# Patient Record
Sex: FEMALE | Race: WHITE | NOT HISPANIC OR LATINO | Employment: OTHER | URBAN - METROPOLITAN AREA
[De-identification: names, ages, dates, MRNs, and addresses within clinical notes are randomized per-mention and may not be internally consistent; named-entity substitution may affect disease eponyms.]

---

## 2017-01-20 ENCOUNTER — OFFICE VISIT (OUTPATIENT)
Dept: FAMILY MEDICINE CLINIC | Facility: CLINIC | Age: 82
End: 2017-01-20

## 2017-01-20 VITALS
WEIGHT: 136.2 LBS | BODY MASS INDEX: 21.89 KG/M2 | SYSTOLIC BLOOD PRESSURE: 108 MMHG | TEMPERATURE: 98.8 F | DIASTOLIC BLOOD PRESSURE: 48 MMHG | RESPIRATION RATE: 20 BRPM | HEIGHT: 66 IN | HEART RATE: 72 BPM

## 2017-01-20 DIAGNOSIS — H91.93 HEARING LOSS, BILATERAL: ICD-10-CM

## 2017-01-20 DIAGNOSIS — Z76.89 ESTABLISHING CARE WITH NEW DOCTOR, ENCOUNTER FOR: ICD-10-CM

## 2017-01-20 DIAGNOSIS — I10 ESSENTIAL HYPERTENSION: ICD-10-CM

## 2017-01-20 DIAGNOSIS — R29.6 FREQUENT FALLS: ICD-10-CM

## 2017-01-20 DIAGNOSIS — M79.632 LEFT FOREARM PAIN: Primary | ICD-10-CM

## 2017-01-20 PROCEDURE — 99213 OFFICE O/P EST LOW 20 MIN: CPT | Performed by: NURSE PRACTITIONER

## 2017-01-20 RX ORDER — LORAZEPAM 1 MG/1
1 TABLET ORAL EVERY 8 HOURS PRN
COMMUNITY

## 2017-01-20 RX ORDER — PAROXETINE HYDROCHLORIDE 20 MG/1
20 TABLET, FILM COATED ORAL EVERY MORNING
COMMUNITY
End: 2017-06-07 | Stop reason: SDUPTHER

## 2017-01-20 RX ORDER — CHLORPROMAZINE HYDROCHLORIDE 10 MG/1
10 TABLET, FILM COATED ORAL 2 TIMES DAILY
COMMUNITY
End: 2017-05-11 | Stop reason: SDUPTHER

## 2017-01-20 RX ORDER — BISOPROLOL FUMARATE 5 MG/1
2.5 TABLET, FILM COATED ORAL DAILY
COMMUNITY
End: 2017-04-28 | Stop reason: SDUPTHER

## 2017-01-20 NOTE — MR AVS SNAPSHOT
"                        Mallory Champagne   1/20/2017 3:00 PM   Office Visit    Dept Phone:  [phone]   Encounter #:  [id number]    Provider:  MAGALY Moreira   Department:  [facility]IC[facility]                Your Full Care Plan              Your Updated Medication List          This list is accurate as of: 1/20/17  4:34 PM.  Always use your most recent med list.                bisoprolol 5 MG tablet   Commonly known as:  ZEBeta       chlorproMAZINE 10 MG tablet   Commonly known as:  THORAZINE       LORazepam 1 MG tablet   Commonly known as:  ATIVAN       PARoxetine 20 MG tablet   Commonly known as:  PAXIL               We Performed the Following     CBC & AUTO Differential     Comprehensive Metabolic Panel     TSH     XR Forearm 2 View Left       You Were Diagnosed With        Codes Comments    Left forearm pain    -  Primary ICD-10-CM: M79.632  ICD-9-CM: 729.5     Frequent falls     ICD-10-CM: R29.6  ICD-9-CM: V15.88     Essential hypertension     ICD-10-CM: I10  ICD-9-CM: 401.9       Instructions     None    Patient Instructions History      Upcoming Appointments     Visit Type Date Time Department    NEW PATIENT 1/20/2017  3:00 PM [facility]      Filecoin Signup     Our records indicate that you have declined [facility] Filecoin signup. If you would like to sign up for File[employer], please email [email or web address] or call [phone] to obtain an activation code.             Other Info from Your Visit           Allergies     No Known Allergies      Reason for Visit     Establish Care     Edema LUE.  She fell last weekend. She was not evaluated anywhere. She refused the ER. Appointments were made for her previous PCP, but they did not keep those appointments. She falls frequently. She feels scared all of the time      Vital Signs     Blood Pressure Pulse Temperature Respirations Height Weight    108/48 72 98.8 °F (37.1 °C) 20 66\" (167.6 cm) 136 lb 3.2 oz (61.8 kg)   " Body Mass Index Smoking Status                21.98 kg/m2 Never Assessed          Problems and Diagnoses Noted     Left forearm pain    -  Primary    Frequent falls        High blood pressure

## 2017-01-20 NOTE — PROGRESS NOTES
Subjective   Mallory Champagne is a 86 y.o. female.     History of Present Illness   NP to establish care accompanied by her  and care giver  She is a previous pt of Dr Bernard who is retiring due to poor health  Pt is hard of hearing (but getting hearing aids soon) and  is a poor historian of current health conditions, will request records from previous PCP as not really able to get an accurate health history at this visit  Care giver stays with family during week and reports that pt is having frequent falls and needs PT for strength training  Pt ambulates with a walker.  Last labs approximately 6 months ago  LUE swollen for the past week, using ice and keeping arm elevated, no bruising or redness noticed to skin, pt does report some discomfort to left forearm and left shoulder but not to elbow.     The following portions of the patient's history were reviewed and updated as appropriate: allergies, current medications, past family history, past medical history, past social history, past surgical history and problem list.    Review of Systems   Constitutional: Positive for activity change. Negative for unexpected weight change.   HENT: Positive for hearing loss.    Eyes: Negative.    Respiratory: Negative.    Cardiovascular: Positive for leg swelling.   Gastrointestinal: Positive for constipation.   Endocrine: Negative.    Genitourinary: Negative.    Musculoskeletal: Positive for joint swelling (left upper extremity swelling).   Skin: Negative.    Allergic/Immunologic: Negative.    Neurological: Negative.    Hematological: Negative.    Psychiatric/Behavioral: Positive for confusion and dysphoric mood.       Objective   Physical Exam   Constitutional: She appears well-developed and well-nourished. No distress.   HENT:   Head: Normocephalic.   Right Ear: External ear normal. Decreased hearing is noted.   Left Ear: External ear normal. Decreased hearing is noted.   Nose: Nose normal.   Mouth/Throat: Oropharynx  is clear and moist.   Eyes: Pupils are equal, round, and reactive to light.   Neck: Neck supple.   Cardiovascular: Normal rate, regular rhythm and normal heart sounds.    Pulmonary/Chest: Effort normal and breath sounds normal.       Musculoskeletal:        Left elbow: She exhibits swelling (swelling along elbow and forearm into hand, no redness, I suspect lymphedema). Tenderness (tenderness of left forearm but not of elbow) found.   Neurological: She is alert.   Skin: Skin is warm, dry and intact. She is not diaphoretic.   Nursing note and vitals reviewed.      Assessment/Plan   Mallory was seen today for establish care and edema.    Diagnoses and all orders for this visit:    Left forearm pain  -     XR Forearm 2 View Left    Frequent falls    Essential hypertension  -     CBC & AUTO Differential  -     Comprehensive Metabolic Panel  -     TSH

## 2017-01-21 DIAGNOSIS — M25.471 SWELLING OF BOTH ANKLES: ICD-10-CM

## 2017-01-21 DIAGNOSIS — M25.472 SWELLING OF BOTH ANKLES: ICD-10-CM

## 2017-01-21 DIAGNOSIS — R29.6 FREQUENT FALLS: Primary | ICD-10-CM

## 2017-01-21 LAB
ALBUMIN SERPL-MCNC: 3.4 G/DL (ref 3.2–4.8)
ALBUMIN/GLOB SERPL: 1 G/DL (ref 1.5–2.5)
ALP SERPL-CCNC: 139 U/L (ref 25–100)
ALT SERPL-CCNC: 9 U/L (ref 7–40)
AST SERPL-CCNC: 15 U/L (ref 0–33)
BASOPHILS # BLD AUTO: 0.03 10*3/MM3 (ref 0–0.2)
BASOPHILS NFR BLD AUTO: 0.3 % (ref 0–1)
BILIRUB SERPL-MCNC: 0.2 MG/DL (ref 0.3–1.2)
BUN SERPL-MCNC: 21 MG/DL (ref 9–23)
BUN/CREAT SERPL: 23.3 (ref 7–25)
CALCIUM SERPL-MCNC: 8.9 MG/DL (ref 8.7–10.4)
CHLORIDE SERPL-SCNC: 107 MMOL/L (ref 99–109)
CO2 SERPL-SCNC: 21 MMOL/L (ref 20–31)
CREAT SERPL-MCNC: 0.9 MG/DL (ref 0.6–1.3)
EOSINOPHIL # BLD AUTO: 0.1 10*3/MM3 (ref 0.1–0.3)
EOSINOPHIL NFR BLD AUTO: 1.1 % (ref 0–3)
ERYTHROCYTE [DISTWIDTH] IN BLOOD BY AUTOMATED COUNT: 16.9 % (ref 11.3–14.5)
GLOBULIN SER CALC-MCNC: 3.4 GM/DL
GLUCOSE SERPL-MCNC: 89 MG/DL (ref 70–100)
HCT VFR BLD AUTO: 35.7 % (ref 34.5–44)
HGB BLD-MCNC: 11.3 G/DL (ref 11.5–15.5)
IMM GRANULOCYTES # BLD: 0.02 10*3/MM3 (ref 0–0.03)
IMM GRANULOCYTES NFR BLD: 0.2 % (ref 0–0.6)
LYMPHOCYTES # BLD AUTO: 1.44 10*3/MM3 (ref 0.6–4.8)
LYMPHOCYTES NFR BLD AUTO: 16.2 % (ref 24–44)
MCH RBC QN AUTO: 26.3 PG (ref 27–31)
MCHC RBC AUTO-ENTMCNC: 31.7 G/DL (ref 32–36)
MCV RBC AUTO: 83 FL (ref 80–99)
MONOCYTES # BLD AUTO: 0.57 10*3/MM3 (ref 0–1)
MONOCYTES NFR BLD AUTO: 6.4 % (ref 0–12)
NEUTROPHILS # BLD AUTO: 6.74 10*3/MM3 (ref 1.5–8.3)
NEUTROPHILS NFR BLD AUTO: 75.8 % (ref 41–71)
PLATELET # BLD AUTO: 434 10*3/MM3 (ref 150–450)
POTASSIUM SERPL-SCNC: 4.6 MMOL/L (ref 3.5–5.5)
PROT SERPL-MCNC: 6.8 G/DL (ref 5.7–8.2)
RBC # BLD AUTO: 4.3 10*6/MM3 (ref 3.89–5.14)
SODIUM SERPL-SCNC: 142 MMOL/L (ref 132–146)
TSH SERPL DL<=0.005 MIU/L-ACNC: 1.72 MIU/ML (ref 0.35–5.35)
WBC # BLD AUTO: 8.9 10*3/MM3 (ref 3.5–10.8)

## 2017-01-22 NOTE — PATIENT INSTRUCTIONS
Fall Prevention in the Home   Falls can cause injuries and can affect people from all age groups. There are many simple things that you can do to make your home safe and to help prevent falls.  WHAT CAN I DO ON THE OUTSIDE OF MY HOME?  · Regularly repair the edges of walkways and driveways and fix any cracks.  · Remove high doorway thresholds.  · Trim any shrubbery on the main path into your home.  · Use bright outdoor lighting.  · Clear walkways of debris and clutter, including tools and rocks.  · Regularly check that handrails are securely fastened and in good repair. Both sides of any steps should have handrails.  · Install guardrails along the edges of any raised decks or porches.  · Have leaves, snow, and ice cleared regularly.  · Use sand or salt on walkways during winter months.  · In the garage, clean up any spills right away, including grease or oil spills.  WHAT CAN I DO IN THE BATHROOM?  · Use night lights.  · Install grab bars by the toilet and in the tub and shower. Do not use towel bars as grab bars.  · Use non-skid mats or decals on the floor of the tub or shower.  · If you need to sit down while you are in the shower, use a plastic, non-slip stool..  · Keep the floor dry. Immediately clean up any water that spills on the floor.  · Remove soap buildup in the tub or shower on a regular basis.  · Attach bath mats securely with double-sided non-slip rug tape.  · Remove throw rugs and other tripping hazards from the floor.  WHAT CAN I DO IN THE BEDROOM?  · Use night lights.  · Make sure that a bedside light is easy to reach.  · Do not use oversized bedding that drapes onto the floor.  · Have a firm chair that has side arms to use for getting dressed.  · Remove throw rugs and other tripping hazards from the floor.  WHAT CAN I DO IN THE KITCHEN?   · Clean up any spills right away.  · Avoid walking on wet floors.  · Place frequently used items in easy-to-reach places.  · If you need to reach for something  above you, use a sturdy step stool that has a grab bar.  · Keep electrical cables out of the way.  · Do not use floor polish or wax that makes floors slippery. If you have to use wax, make sure that it is non-skid floor wax.  · Remove throw rugs and other tripping hazards from the floor.  WHAT CAN I DO IN THE STAIRWAYS?  · Do not leave any items on the stairs.  · Make sure that there are handrails on both sides of the stairs. Fix handrails that are broken or loose. Make sure that handrails are as long as the stairways.  · Check any carpeting to make sure that it is firmly attached to the stairs. Fix any carpet that is loose or worn.  · Avoid having throw rugs at the top or bottom of stairways, or secure the rugs with carpet tape to prevent them from moving.  · Make sure that you have a light switch at the top of the stairs and the bottom of the stairs. If you do not have them, have them installed.  WHAT ARE SOME OTHER FALL PREVENTION TIPS?  · Wear closed-toe shoes that fit well and support your feet. Wear shoes that have rubber soles or low heels.  · When you use a stepladder, make sure that it is completely opened and that the sides are firmly locked. Have someone hold the ladder while you are using it. Do not climb a closed stepladder.  · Add color or contrast paint or tape to grab bars and handrails in your home. Place contrasting color strips on the first and last steps.  · Use mobility aids as needed, such as canes, walkers, scooters, and crutches.  · Turn on lights if it is dark. Replace any light bulbs that burn out.  · Set up furniture so that there are clear paths. Keep the furniture in the same spot.  · Fix any uneven floor surfaces.  · Choose a carpet design that does not hide the edge of steps of a stairway.  · Be aware of any and all pets.  · Review your medicines with your healthcare provider. Some medicines can cause dizziness or changes in blood pressure, which increase your risk of falling.  Talk  with your health care provider about other ways that you can decrease your risk of falls. This may include working with a physical therapist or  to improve your strength, balance, and endurance.     This information is not intended to replace advice given to you by your health care provider. Make sure you discuss any questions you have with your health care provider.     Document Released: 12/08/2003 Document Revised: 05/03/2016 Document Reviewed: 01/22/2016  Elsevier Interactive Patient Education ©2016 Elsevier Inc[employer]

## 2017-02-09 ENCOUNTER — TELEPHONE (OUTPATIENT)
Dept: FAMILY MEDICINE CLINIC | Facility: CLINIC | Age: 82
End: 2017-02-09

## 2017-02-17 ENCOUNTER — OFFICE VISIT (OUTPATIENT)
Dept: FAMILY MEDICINE CLINIC | Facility: CLINIC | Age: 82
End: 2017-02-17

## 2017-02-17 VITALS
HEART RATE: 94 BPM | SYSTOLIC BLOOD PRESSURE: 138 MMHG | DIASTOLIC BLOOD PRESSURE: 54 MMHG | HEIGHT: 66 IN | BODY MASS INDEX: 22.02 KG/M2 | RESPIRATION RATE: 18 BRPM | WEIGHT: 137 LBS | TEMPERATURE: 98.4 F

## 2017-02-17 DIAGNOSIS — M20.42 HAMMER TOES OF BOTH FEET: Primary | ICD-10-CM

## 2017-02-17 DIAGNOSIS — I10 ESSENTIAL HYPERTENSION: ICD-10-CM

## 2017-02-17 DIAGNOSIS — R42 DIZZINESS: ICD-10-CM

## 2017-02-17 DIAGNOSIS — M20.41 HAMMER TOES OF BOTH FEET: Primary | ICD-10-CM

## 2017-02-17 PROCEDURE — 99213 OFFICE O/P EST LOW 20 MIN: CPT | Performed by: FAMILY MEDICINE

## 2017-02-17 NOTE — PROGRESS NOTES
Subjective   Mallory Champagne is a 86 y.o. female.     History of Present Illness     They recommended PT and OT by Amy Simmons at last appointment  The therapist was concerned about some of their medications.  It appears they are concerned about ativan due to fatigue  She is sleeping 20 out of 24 hours a sleep and is not doing anything    She has had 2 cancer surgeries  Chart notes that dc'd from  due to noncompliance    They would like to see a podiatrist for her feet    Every time she stands up she is dizzy  Couple steps she is ready to faint and OOB    She is constipated but BMs go in cycles      Review of Systems   Constitutional: Positive for fatigue.   Neurological: Positive for dizziness.       Objective   Physical Exam   Constitutional: She appears well-developed and well-nourished. No distress.   Cardiovascular: Normal rate, regular rhythm and normal heart sounds.    Pulmonary/Chest: Effort normal and breath sounds normal.   Musculoskeletal:   Slow gait with walker   Psychiatric: She has a normal mood and affect. Her behavior is normal.   Nursing note and vitals reviewed.      Assessment/Plan   Mallory was seen today for med management and anxiety.    Diagnoses and all orders for this visit:    Hammer toes of both feet  -     Ambulatory Referral to Podiatry    Dizziness    Essential hypertension    podiatry refill  Will work on reducing ativan usage, hand written note given  May need to decrease the zebeta for orthostatic hypotension, consider reducing dose in the future

## 2017-04-03 ENCOUNTER — OFFICE VISIT (OUTPATIENT)
Dept: FAMILY MEDICINE CLINIC | Facility: CLINIC | Age: 82
End: 2017-04-03

## 2017-04-03 VITALS
WEIGHT: 135 LBS | BODY MASS INDEX: 21.69 KG/M2 | TEMPERATURE: 97.6 F | HEIGHT: 66 IN | RESPIRATION RATE: 20 BRPM | HEART RATE: 81 BPM | OXYGEN SATURATION: 98 % | SYSTOLIC BLOOD PRESSURE: 110 MMHG | DIASTOLIC BLOOD PRESSURE: 52 MMHG

## 2017-04-03 DIAGNOSIS — R60.1 GENERALIZED EDEMA: Primary | ICD-10-CM

## 2017-04-03 DIAGNOSIS — F33.0 MILD EPISODE OF RECURRENT MAJOR DEPRESSIVE DISORDER (HCC): ICD-10-CM

## 2017-04-03 PROBLEM — C50.919 BREAST CANCER (HCC): Status: ACTIVE | Noted: 2017-04-03

## 2017-04-03 PROCEDURE — 99213 OFFICE O/P EST LOW 20 MIN: CPT | Performed by: FAMILY MEDICINE

## 2017-04-03 RX ORDER — FUROSEMIDE 20 MG/1
20 TABLET ORAL EVERY MORNING
Qty: 30 TABLET | Refills: 2 | Status: SHIPPED | OUTPATIENT
Start: 2017-04-03 | End: 2017-04-28 | Stop reason: SDUPTHER

## 2017-04-03 NOTE — PROGRESS NOTES
Subjective   Mallory Champagne is a 86 y.o. female.     History of Present Illness     She does note that she has been weak feeling still  still struggle with walking, she does use the walker at home on regular basis  Has tried HH as well but did not work out    She is also worried aboutthe swelling in her legs and left arm, these are chronic but she is not taking anything for it    The following portions of the patient's history were reviewed and updated as appropriate: allergies, current medications, past family history, past medical history, past social history, past surgical history and problem list.    Review of Systems   Cardiovascular: Positive for leg swelling.   Gastrointestinal: Positive for constipation.       Objective   Physical Exam   Constitutional: She appears well-developed and well-nourished.   HENT:   Head: Normocephalic and atraumatic.   Cardiovascular: Normal rate and normal heart sounds.    Pulmonary/Chest: Effort normal and breath sounds normal.   Musculoskeletal: She exhibits edema (severe edema BLE and LUE).   Skin: Skin is dry.   Psychiatric: She has a normal mood and affect. Her behavior is normal.       Assessment/Plan   Diagnoses and all orders for this visit:    Generalized edema  -     furosemide (LASIX) 20 MG tablet; Take 1 tablet by mouth Every Morning.    Mild episode of recurrent major depressive disorder    will try lasix for edema, and recheck in 1-2 months.  She will call with any issues.  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  Will request old records.  Mood stable, no change in medicine

## 2017-04-05 ENCOUNTER — TELEPHONE (OUTPATIENT)
Dept: FAMILY MEDICINE CLINIC | Facility: CLINIC | Age: 82
End: 2017-04-05

## 2017-04-05 NOTE — TELEPHONE ENCOUNTER
----- Message from Daria Houston sent at 4/5/2017  1:23 PM EDT -----  Contact: [facility]   [facility]  Called stating the pt was to receive another medication but there was not an order in her chart for anything new for her or her ...     [phone]

## 2017-04-05 NOTE — TELEPHONE ENCOUNTER
We were going to try lasix for her swelling.  We had discussed something for constipation but they declined. Does she want to try colace for constipation?

## 2017-04-26 ENCOUNTER — TELEPHONE (OUTPATIENT)
Dept: FAMILY MEDICINE CLINIC | Facility: CLINIC | Age: 82
End: 2017-04-26

## 2017-04-26 NOTE — TELEPHONE ENCOUNTER
----- Message from Daria Houston sent at 4/26/2017  2:08 PM EDT -----  Contact: DR. WILCOX; JAYE; MEDICATION QUESTION  PT IS CURRENTLY DIZZY AND HAS MADE AN APPT FOR Friday BUT PT  WANTED TO KNOW IF THERE IS ANYTHING THAT SHE CAN TAKE FOR HER DIZZINESS.       CALL BACK   [phone]

## 2017-04-28 ENCOUNTER — OFFICE VISIT (OUTPATIENT)
Dept: FAMILY MEDICINE CLINIC | Facility: CLINIC | Age: 82
End: 2017-04-28

## 2017-04-28 VITALS
RESPIRATION RATE: 18 BRPM | HEART RATE: 80 BPM | HEIGHT: 66 IN | SYSTOLIC BLOOD PRESSURE: 118 MMHG | DIASTOLIC BLOOD PRESSURE: 52 MMHG | WEIGHT: 131 LBS | TEMPERATURE: 97.3 F | BODY MASS INDEX: 21.05 KG/M2

## 2017-04-28 DIAGNOSIS — R42 DIZZINESS: Primary | ICD-10-CM

## 2017-04-28 DIAGNOSIS — I10 ESSENTIAL HYPERTENSION: ICD-10-CM

## 2017-04-28 DIAGNOSIS — R60.1 GENERALIZED EDEMA: ICD-10-CM

## 2017-04-28 PROCEDURE — 99213 OFFICE O/P EST LOW 20 MIN: CPT | Performed by: FAMILY MEDICINE

## 2017-04-28 RX ORDER — BISOPROLOL FUMARATE 5 MG/1
2.5 TABLET, FILM COATED ORAL DAILY
Qty: 15 TABLET | Refills: 5 | Status: SHIPPED | OUTPATIENT
Start: 2017-04-28 | End: 2017-10-02 | Stop reason: SDUPTHER

## 2017-04-28 RX ORDER — FUROSEMIDE 20 MG/1
20 TABLET ORAL EVERY MORNING
Qty: 30 TABLET | Refills: 5 | Status: SHIPPED | OUTPATIENT
Start: 2017-04-28 | End: 2017-11-27 | Stop reason: SDUPTHER

## 2017-04-28 RX ORDER — MECLIZINE HYDROCHLORIDE 25 MG/1
TABLET ORAL
Qty: 40 TABLET | Refills: 1 | Status: SHIPPED | OUTPATIENT
Start: 2017-04-28 | End: 2017-11-27

## 2017-04-28 NOTE — PROGRESS NOTES
Subjective   Mallory Champagne is a 86 y.o. female.     History of Present Illness     The meclizine really helped her dizziness for the last week  Worse with change in position and walking  Sitting down would make this better    Has had some blurred vision today  No vomiting, pt denies nausea, family says yes  No change in her appetite      Review of Systems   Neurological: Positive for dizziness.       Objective   Physical Exam   Constitutional: She appears well-developed and well-nourished. No distress.   Eyes: Conjunctivae, EOM and lids are normal. Pupils are equal, round, and reactive to light.   Cardiovascular: Normal rate, regular rhythm and normal heart sounds.    Pulmonary/Chest: Effort normal and breath sounds normal.   Musculoskeletal:   Walks with walker   Psychiatric: She has a normal mood and affect. Her behavior is normal.   Nursing note and vitals reviewed.      Assessment/Plan   Mallory was seen today for follow-up.    Diagnoses and all orders for this visit:    Dizziness  -     meclizine (ANTIVERT) 25 MG tablet; 1/2 to 1 to 2 pills as needed every 4 hours for dizziness  -     CBC & Differential  -     Comprehensive Metabolic Panel    Generalized edema  -     furosemide (LASIX) 20 MG tablet; Take 1 tablet by mouth Every Morning.    Essential hypertension  -     bisoprolol (ZEBeta) 5 MG tablet; Take 0.5 tablets by mouth Daily.    will treat with meclizine that has helped quite a bit and start Epley maneuvers at home.  Check basic labs and f/u pending results  Carmelina mentions concern for pt and Bill living alone but unsure what to do.  [facility] can provide

## 2017-04-29 LAB
ALBUMIN SERPL-MCNC: 3.4 G/DL (ref 3.2–4.8)
ALBUMIN/GLOB SERPL: 1 G/DL (ref 1.5–2.5)
ALP SERPL-CCNC: 133 U/L (ref 25–100)
ALT SERPL-CCNC: 9 U/L (ref 7–40)
AST SERPL-CCNC: 13 U/L (ref 0–33)
BASOPHILS # BLD AUTO: 0.04 10*3/MM3 (ref 0–0.2)
BASOPHILS NFR BLD AUTO: 0.4 % (ref 0–1)
BILIRUB SERPL-MCNC: 0.3 MG/DL (ref 0.3–1.2)
BUN SERPL-MCNC: 17 MG/DL (ref 9–23)
BUN/CREAT SERPL: 17 (ref 7–25)
CALCIUM SERPL-MCNC: 10.3 MG/DL (ref 8.7–10.4)
CHLORIDE SERPL-SCNC: 105 MMOL/L (ref 99–109)
CO2 SERPL-SCNC: 23 MMOL/L (ref 20–31)
CREAT SERPL-MCNC: 1 MG/DL (ref 0.6–1.3)
EOSINOPHIL # BLD AUTO: 0.12 10*3/MM3 (ref 0.1–0.3)
EOSINOPHIL NFR BLD AUTO: 1.2 % (ref 0–3)
ERYTHROCYTE [DISTWIDTH] IN BLOOD BY AUTOMATED COUNT: 15.8 % (ref 11.3–14.5)
GLOBULIN SER CALC-MCNC: 3.4 GM/DL
GLUCOSE SERPL-MCNC: 105 MG/DL (ref 70–100)
HCT VFR BLD AUTO: 43.6 % (ref 34.5–44)
HGB BLD-MCNC: 13.6 G/DL (ref 11.5–15.5)
IMM GRANULOCYTES # BLD: 0.01 10*3/MM3 (ref 0–0.03)
IMM GRANULOCYTES NFR BLD: 0.1 % (ref 0–0.6)
LYMPHOCYTES # BLD AUTO: 1.75 10*3/MM3 (ref 0.6–4.8)
LYMPHOCYTES NFR BLD AUTO: 17.9 % (ref 24–44)
MCH RBC QN AUTO: 28.4 PG (ref 27–31)
MCHC RBC AUTO-ENTMCNC: 31.2 G/DL (ref 32–36)
MCV RBC AUTO: 91 FL (ref 80–99)
MONOCYTES # BLD AUTO: 0.55 10*3/MM3 (ref 0–1)
MONOCYTES NFR BLD AUTO: 5.6 % (ref 0–12)
NEUTROPHILS # BLD AUTO: 7.29 10*3/MM3 (ref 1.5–8.3)
NEUTROPHILS NFR BLD AUTO: 74.8 % (ref 41–71)
PLATELET # BLD AUTO: 489 10*3/MM3 (ref 150–450)
POTASSIUM SERPL-SCNC: 4.3 MMOL/L (ref 3.5–5.5)
PROT SERPL-MCNC: 6.8 G/DL (ref 5.7–8.2)
RBC # BLD AUTO: 4.79 10*6/MM3 (ref 3.89–5.14)
SODIUM SERPL-SCNC: 138 MMOL/L (ref 132–146)
WBC # BLD AUTO: 9.76 10*3/MM3 (ref 3.5–10.8)

## 2017-05-10 ENCOUNTER — TELEPHONE (OUTPATIENT)
Dept: FAMILY MEDICINE CLINIC | Facility: CLINIC | Age: 82
End: 2017-05-10

## 2017-05-11 RX ORDER — CHLORPROMAZINE HYDROCHLORIDE 10 MG/1
10 TABLET, FILM COATED ORAL 2 TIMES DAILY
Qty: 60 TABLET | Refills: 5 | Status: SHIPPED | OUTPATIENT
Start: 2017-05-11 | End: 2017-06-02 | Stop reason: SDUPTHER

## 2017-06-02 ENCOUNTER — OFFICE VISIT (OUTPATIENT)
Dept: FAMILY MEDICINE CLINIC | Facility: CLINIC | Age: 82
End: 2017-06-02

## 2017-06-02 VITALS
DIASTOLIC BLOOD PRESSURE: 65 MMHG | WEIGHT: 128 LBS | RESPIRATION RATE: 28 BRPM | OXYGEN SATURATION: 99 % | TEMPERATURE: 97.9 F | HEART RATE: 122 BPM | BODY MASS INDEX: 20.66 KG/M2 | SYSTOLIC BLOOD PRESSURE: 138 MMHG

## 2017-06-02 DIAGNOSIS — R48.2 GAIT APRAXIA OF ELDERLY: ICD-10-CM

## 2017-06-02 DIAGNOSIS — R44.0 AUDITORY HALLUCINATIONS: Primary | ICD-10-CM

## 2017-06-02 DIAGNOSIS — R44.1 VISUAL HALLUCINATIONS: ICD-10-CM

## 2017-06-02 PROCEDURE — 99213 OFFICE O/P EST LOW 20 MIN: CPT | Performed by: FAMILY MEDICINE

## 2017-06-02 RX ORDER — CHLORPROMAZINE HYDROCHLORIDE 25 MG/1
25 TABLET, FILM COATED ORAL 2 TIMES DAILY
Qty: 60 TABLET | Refills: 2 | Status: SHIPPED | OUTPATIENT
Start: 2017-06-02 | End: 2017-08-08 | Stop reason: SDUPTHER

## 2017-06-02 NOTE — PROGRESS NOTES
Subjective   Mallory Champagne is a 87 y.o. female.     History of Present Illness     She complains a funy feeling on the top of her head  Tight and tingling  That is better    She was quite nasueous i the past but that is better  she was able to eat better today but has had poor appetite for a while    She thinks and says things that she does not recall  She knew that some one was coming to the the house and was at the door, heard chorus of singers  She has seen people in the past and maybe saw some one last night but Rafita did not find nor see anyone who was coming to see her  she has heard people telling her they are going to murder her as well as stories from some one in [facility]  Pt also has a lot of anxiety and worry  Ativan not helping any more but does make her sleepy        Review of Systems   Constitutional: Negative.    Psychiatric/Behavioral:        Hpi       Objective   Physical Exam   Constitutional: She appears well-developed and well-nourished. No distress.   Cardiovascular: Normal rate, regular rhythm and normal heart sounds.    Pulmonary/Chest: Effort normal and breath sounds normal.   Musculoskeletal:   Very slow gait, uses walker to walk and slow to get out of chair   Psychiatric: She has a normal mood and affect. Her behavior is normal.   Nursing note and vitals reviewed.      Assessment/Plan   Mallory was seen today for weakness & no appetite.    Diagnoses and all orders for this visit:    Auditory hallucinations  -     chlorproMAZINE (THORAZINE) 25 MG tablet; Take 1 tablet by mouth 2 (Two) Times a Day.  -     MRI Brain Without Contrast; Future    Visual hallucinations  -     chlorproMAZINE (THORAZINE) 25 MG tablet; Take 1 tablet by mouth 2 (Two) Times a Day.  -     MRI Brain Without Contrast; Future    Gait apraxia of elderly  -     Ambulatory Referral to Home Health    she is having auditory and visual hallucinations. May not be able to get rid of these completely but I did ask her  to  tereseor their frequency and asked their daughter, Brenda, to call back with update.  MRI of brain and will increase thorazine from 10 BId to 25 BID, call next week with update

## 2017-06-06 ENCOUNTER — TELEPHONE (OUTPATIENT)
Dept: FAMILY MEDICINE CLINIC | Facility: CLINIC | Age: 82
End: 2017-06-06

## 2017-06-06 NOTE — TELEPHONE ENCOUNTER
----- Message from Melisa Barbour sent at 6/6/2017  1:23 PM EDT -----  Contact: JERI;[facility]  STEFANIE FROM [facility]  PT WAS NOT ADMITTED TO HOME HEALTH SERVICES BECAUSE SHE WAS AT  THE SAME LEVEL OF FUNCTION AT TIME OF LAST DISCHARGE    HOME EXERCISE PROGRAM WAS REVIEWED BY PT AND CAREGIVER AND   CAREGIVER IS OK WITH ASSISTING WITH THAT-AT PT REQUEST ALL MEDS  WERE REVIEW TO MAKE SURE  WAS ADMINISTERING ALL MEDS PROPERLY    [phone]

## 2017-06-07 ENCOUNTER — TELEPHONE (OUTPATIENT)
Dept: FAMILY MEDICINE CLINIC | Facility: CLINIC | Age: 82
End: 2017-06-07

## 2017-06-07 RX ORDER — PAROXETINE HYDROCHLORIDE 20 MG/1
20 TABLET, FILM COATED ORAL EVERY MORNING
Qty: 30 TABLET | Refills: 5 | Status: SHIPPED | OUTPATIENT
Start: 2017-06-07 | End: 2017-11-27 | Stop reason: SDUPTHER

## 2017-06-07 NOTE — TELEPHONE ENCOUNTER
----- Message from Melisa To sent at 6/7/2017 12:41 PM EDT -----  Contact: JERI  PATIENT  JAYE CALLED THIS MORNING AND SAID HE HAD ANOTHER RX WHICH WAS WRITTEN BY PREVIOUS  THAT SHE NEEDS REFILLED.    PAROXETINE 20 MG    [facility]

## 2017-06-09 DIAGNOSIS — R93.89 ABNORMAL MRI: Primary | ICD-10-CM

## 2017-07-31 DIAGNOSIS — H02.844 EDEMA OF LEFT UPPER EYELID: Primary | ICD-10-CM

## 2017-07-31 DIAGNOSIS — M79.89 LEFT UPPER EXTREMITY SWELLING: ICD-10-CM

## 2017-08-08 ENCOUNTER — TELEPHONE (OUTPATIENT)
Dept: FAMILY MEDICINE CLINIC | Facility: CLINIC | Age: 82
End: 2017-08-08

## 2017-08-08 ENCOUNTER — OFFICE VISIT (OUTPATIENT)
Dept: FAMILY MEDICINE CLINIC | Facility: CLINIC | Age: 82
End: 2017-08-08

## 2017-08-08 VITALS
DIASTOLIC BLOOD PRESSURE: 90 MMHG | HEART RATE: 68 BPM | TEMPERATURE: 97.2 F | BODY MASS INDEX: 22.5 KG/M2 | HEIGHT: 66 IN | SYSTOLIC BLOOD PRESSURE: 124 MMHG | WEIGHT: 140 LBS | RESPIRATION RATE: 18 BRPM

## 2017-08-08 DIAGNOSIS — R48.2 GAIT APRAXIA OF ELDERLY: ICD-10-CM

## 2017-08-08 DIAGNOSIS — R60.1 GENERALIZED EDEMA: Primary | ICD-10-CM

## 2017-08-08 DIAGNOSIS — R44.1 VISUAL HALLUCINATIONS: ICD-10-CM

## 2017-08-08 DIAGNOSIS — R09.89 OTHER SPECIFIED SYMPTOMS AND SIGNS INVOLVING THE CIRCULATORY AND RESPIRATORY SYSTEMS: ICD-10-CM

## 2017-08-08 DIAGNOSIS — R44.0 AUDITORY HALLUCINATIONS: ICD-10-CM

## 2017-08-08 DIAGNOSIS — I73.9 PERIPHERAL VASCULAR DISEASE (HCC): ICD-10-CM

## 2017-08-08 PROCEDURE — 99213 OFFICE O/P EST LOW 20 MIN: CPT | Performed by: FAMILY MEDICINE

## 2017-08-08 RX ORDER — CHLORPROMAZINE HYDROCHLORIDE 25 MG/1
25 TABLET, FILM COATED ORAL 2 TIMES DAILY
Qty: 60 TABLET | Refills: 5 | Status: SHIPPED | OUTPATIENT
Start: 2017-08-08 | End: 2017-09-05 | Stop reason: SDUPTHER

## 2017-08-08 NOTE — PROGRESS NOTES
Subjective   Mallory Champagne is a 87 y.o. female.     History of Present Illness     She continues to have issues with swelling of her left arm and legs  referal was made to vascular but they wanted dopplers done first    She is still having some auditory and visual hallucinations but this has improved since we increased the thorazine  This was a major issue in the past but has improved    She continues to have issues with walking and doing things around the home  [facility] would not see her  Will try different group      Review of Systems   Constitutional: Negative.        Objective   Physical Exam   Constitutional: She appears well-developed and well-nourished. No distress.   Cardiovascular: Normal rate, regular rhythm and normal heart sounds.    Pulmonary/Chest: Effort normal and breath sounds normal.   Musculoskeletal:   Hard to get out of chair, gait slow with walker  Significant edema of BLE and LUE   Psychiatric: She has a normal mood and affect. Her behavior is normal.   Nursing note and vitals reviewed.      Assessment/Plan   Mallory was seen today for arm swelling.    Diagnoses and all orders for this visit:    Generalized edema    Visual hallucinations    Auditory hallucinations    Gait apraxia of elderly  -     Ambulatory Referral to Home Health    trying to get work up for edema, vascular wanted imaging done prior to seeing her.  I would like their help in assessing for reason the edema is there but they are not willing to see her at this time.  Will order US  Thorazine helping with her hallucinations  Will try to get HH again, pt needs help with strength.  Will try to avoid [facility]

## 2017-08-08 NOTE — TELEPHONE ENCOUNTER
----- Message from Lucia Zarco sent at 8/8/2017  3:56 PM EDT -----  Contact: DR WILCOX MED REFILL  PATIENT NEEDS A REFILL ON THE THORAZINE 25MG TO [facility]

## 2017-08-24 DIAGNOSIS — I77.1 SUBCLAVIAN ARTERY STENOSIS (HCC): ICD-10-CM

## 2017-08-24 DIAGNOSIS — M79.89 LEFT UPPER EXTREMITY SWELLING: Primary | ICD-10-CM

## 2017-08-25 ENCOUNTER — TELEPHONE (OUTPATIENT)
Dept: FAMILY MEDICINE CLINIC | Facility: CLINIC | Age: 82
End: 2017-08-25

## 2017-08-25 NOTE — TELEPHONE ENCOUNTER
----- Message from Daria Houston sent at 8/25/2017  2:18 PM EDT -----  Contact: DR. ALVARADO; jim Sofia is needing a script for a chair lift due to they are having one installed in the garage and if they have a script from the doctor then they will get it at a discount. They would like to have it emailed to the script [email or web address]. The email belongs to the caretaker jim aldana.       Call back   [phone]

## 2017-09-05 ENCOUNTER — TELEPHONE (OUTPATIENT)
Dept: FAMILY MEDICINE CLINIC | Facility: CLINIC | Age: 82
End: 2017-09-05

## 2017-09-05 DIAGNOSIS — R44.1 VISUAL HALLUCINATIONS: ICD-10-CM

## 2017-09-05 DIAGNOSIS — R44.0 AUDITORY HALLUCINATIONS: ICD-10-CM

## 2017-09-05 RX ORDER — CHLORPROMAZINE HYDROCHLORIDE 25 MG/1
25 TABLET, FILM COATED ORAL 2 TIMES DAILY
Qty: 60 TABLET | Refills: 5 | Status: SHIPPED | OUTPATIENT
Start: 2017-09-05 | End: 2017-11-27 | Stop reason: SDUPTHER

## 2017-09-05 NOTE — TELEPHONE ENCOUNTER
----- Message from Mila Brown sent at 9/5/2017 10:51 AM EDT -----  Contact: shaun ward with [facility]  Missed appt with patient last week, patient would not see anyone but Shaun for OT and Shaun's schedule did not allow it

## 2017-09-05 NOTE — TELEPHONE ENCOUNTER
----- Message from Melisa Barbour sent at 9/5/2017 12:59 PM EDT -----  Contact: NISHA CALLED  REFILL ON chlorproMAZINE (THORAZINE) 25 MG tablet BID   [facility]    [phone]        PLEASE CALL PT WHEN CALLED IN

## 2017-10-02 DIAGNOSIS — I10 ESSENTIAL HYPERTENSION: ICD-10-CM

## 2017-10-02 RX ORDER — BISOPROLOL FUMARATE 5 MG/1
TABLET, FILM COATED ORAL
Qty: 15 TABLET | Refills: 1 | Status: SHIPPED | OUTPATIENT
Start: 2017-10-02

## 2017-10-18 ENCOUNTER — TELEPHONE (OUTPATIENT)
Dept: FAMILY MEDICINE CLINIC | Facility: CLINIC | Age: 82
End: 2017-10-18

## 2017-10-18 NOTE — TELEPHONE ENCOUNTER
----- Message from Lucia Zarco sent at 10/18/2017 10:52 AM EDT -----  Contact: DR WILCOX   PATIENT IS CALLING TO SEE IF YOU WILL WRITE AN PRESCRIPTION FOR A BED SIDE POTTY CHAIR? PATIENT KEEPS FALLING AT NIGHT WHEN SHE GETS UP TO GO TO THE BATHROOM EVEN WITH HELP FROM HER . [facility] THEY [facility] ORDER SENT.  [phone] ([facility])

## 2017-10-18 NOTE — TELEPHONE ENCOUNTER
----- Message from Hilda Navarro sent at 10/18/2017  3:01 PM EDT -----  Contact: JERI CEE   STOPPED BY TO ADVISE WIFE IS HAVING TROUBLE IN GETTING UP AT NIGHT TO GO TO THE BATHROOM. HE IS ASKING FOR A TOILET CHAIR THAT HE CAN SIT NEXT TO HER BED TO DECREASE IN THE POSSIBILITY IN FALLING. SHE HAS FALLEN AGAIN AT HOME AND HE WOULD LIKE THIS ASAP. HE IS WORRIED AND WOULD LIKE A QUICK RESPONSE PLEASE SEND TO JESÚS CEE CAN BE REACHED  [phone]

## 2017-11-17 ENCOUNTER — TELEPHONE (OUTPATIENT)
Dept: FAMILY MEDICINE CLINIC | Facility: CLINIC | Age: 82
End: 2017-11-17

## 2017-11-17 NOTE — TELEPHONE ENCOUNTER
----- Message from Melisa To sent at 11/17/2017  5:39 PM EST -----  Contact: JERI  PATIENT  JAYE CALLED AND ASK IF YOU COULD SEND AN ORDER TO [facility] FOR HER A GOOD WHEELCHAIR WITH A COMFORTABLE SEAT. HER BOTTOM GETS SO SORE. HE SAID HE HAD SPOKEN TO [facility] ABOUT IT    [facility]

## 2017-11-20 ENCOUNTER — TELEPHONE (OUTPATIENT)
Dept: FAMILY MEDICINE CLINIC | Facility: CLINIC | Age: 82
End: 2017-11-20

## 2017-11-20 NOTE — TELEPHONE ENCOUNTER
----- Message from Melisa Barbour sent at 11/20/2017  2:28 PM EST -----  Contact: JERI;MR [facility] STOP BY  PT STOP BY TO CHECK ON STATUS OF PAPERWORK HE LEAVE LAST  WEEK    [phone]

## 2017-11-21 ENCOUNTER — OFFICE VISIT (OUTPATIENT)
Dept: FAMILY MEDICINE CLINIC | Facility: CLINIC | Age: 82
End: 2017-11-21

## 2017-11-21 DIAGNOSIS — R53.1 GENERALIZED WEAKNESS: ICD-10-CM

## 2017-11-21 DIAGNOSIS — R48.2 GAIT APRAXIA OF ELDERLY: Primary | ICD-10-CM

## 2017-11-21 DIAGNOSIS — R42 DIZZINESS: ICD-10-CM

## 2017-11-21 PROCEDURE — S0260 H&P FOR SURGERY: HCPCS | Performed by: FAMILY MEDICINE

## 2017-11-21 NOTE — PROGRESS NOTES
Subjective   Mallory Champagne is a 87 y.o. female.     History of Present Illness     Pt is not with us today, her  comes in to discuss her condition and get paperwork filled out for home health  She continues to struggle with ADLs  Still weak, out of bed 8 hours or so a day and in the chair most of that time.  Walks with walker but has had some falls  Uses a wheelchair to leave the home but only gets out about once every 2 weeks.      Review of Systems   Musculoskeletal: Positive for gait problem.   Neurological: Positive for weakness.       Objective   Physical Exam   Constitutional:   Pt not here today       Assessment/Plan   Mallory was seen today for paperwork.    Diagnoses and all orders for this visit:    Gait apraxia of elderly    Dizziness    Generalized weakness    completed form with quesitons answered by .  Pt has always been weak and does not ambulate when here in my office.  Nothing to suggest every answer he gave was not true.  Completed form for home help.

## 2017-11-27 ENCOUNTER — OFFICE VISIT (OUTPATIENT)
Dept: FAMILY MEDICINE CLINIC | Facility: CLINIC | Age: 82
End: 2017-11-27

## 2017-11-27 VITALS
TEMPERATURE: 97.4 F | RESPIRATION RATE: 20 BRPM | HEART RATE: 72 BPM | HEIGHT: 66 IN | SYSTOLIC BLOOD PRESSURE: 130 MMHG | DIASTOLIC BLOOD PRESSURE: 88 MMHG

## 2017-11-27 DIAGNOSIS — I10 ESSENTIAL HYPERTENSION: ICD-10-CM

## 2017-11-27 DIAGNOSIS — F33.0 MILD EPISODE OF RECURRENT MAJOR DEPRESSIVE DISORDER (HCC): Primary | ICD-10-CM

## 2017-11-27 DIAGNOSIS — R44.0 AUDITORY HALLUCINATIONS: ICD-10-CM

## 2017-11-27 DIAGNOSIS — R60.1 GENERALIZED EDEMA: ICD-10-CM

## 2017-11-27 DIAGNOSIS — R44.1 VISUAL HALLUCINATIONS: ICD-10-CM

## 2017-11-27 PROCEDURE — 99213 OFFICE O/P EST LOW 20 MIN: CPT | Performed by: FAMILY MEDICINE

## 2017-11-27 RX ORDER — PAROXETINE HYDROCHLORIDE 20 MG/1
20 TABLET, FILM COATED ORAL EVERY MORNING
Qty: 30 TABLET | Refills: 5 | Status: SHIPPED | OUTPATIENT
Start: 2017-11-27

## 2017-11-27 RX ORDER — FUROSEMIDE 20 MG/1
20 TABLET ORAL EVERY MORNING
Qty: 30 TABLET | Refills: 5 | Status: SHIPPED | OUTPATIENT
Start: 2017-11-27

## 2017-11-27 RX ORDER — CHLORPROMAZINE HYDROCHLORIDE 25 MG/1
25 TABLET, FILM COATED ORAL 2 TIMES DAILY
Qty: 60 TABLET | Refills: 5 | Status: SHIPPED | OUTPATIENT
Start: 2017-11-27

## 2017-11-27 NOTE — PROGRESS NOTES
Subjective   Mallory Champagne is a 87 y.o. female.     History of Present Illness     They are trying to get some compression stockings for her legs  Pt has had chronic lymphedema and edema in her left LE and LUE    She has spells where she almost passes out  This is worse with sitting and then trying to get up  Happens often  Leisa, their assistant believes BP is low    She also wakes up throughout the night and wakes her  up  This disrupts his and her sleep    The following portions of the patient's history were reviewed and updated as appropriate: allergies, current medications, past family history, past medical history, past social history, past surgical history and problem list.    Review of Systems   Neurological: Positive for weakness (stable).       Objective   Physical Exam   Constitutional: She appears well-developed and well-nourished. No distress.   Cardiovascular: Normal rate, regular rhythm and normal heart sounds.    Pulmonary/Chest: Effort normal and breath sounds normal.   Musculoskeletal:   Still with chronic left arm and leg edema, in wheelchair    Psychiatric: She has a normal mood and affect. Her behavior is normal.   Nursing note and vitals reviewed.      Assessment/Plan   Mallory was seen today for hypertension.    Diagnoses and all orders for this visit:    Mild episode of recurrent major depressive disorder  -     PARoxetine (PAXIL) 20 MG tablet; Take 1 tablet by mouth Every Morning.    Generalized edema  -     furosemide (LASIX) 20 MG tablet; Take 1 tablet by mouth Every Morning.    Essential hypertension    Auditory hallucinations  -     chlorproMAZINE (THORAZINE) 25 MG tablet; Take 1 tablet by mouth 2 (Two) Times a Day.    Visual hallucinations  -     chlorproMAZINE (THORAZINE) 25 MG tablet; Take 1 tablet by mouth 2 (Two) Times a Day.    refill chronic medications  Will stop BP medicine and see if this is causing her syncopal episodes.  Leisa will call back in one week with BP  update  Will try melatonin for sleep and consider trazodone for this in one week INB  Will stop the zebeta as she is feeling weak and dizzy from time to time.  Worse with certain positions. Leisa will call us back next week with an update  Will try melatonin for one week and consider trazodone if this fails

## 2017-12-06 ENCOUNTER — OFFICE VISIT (OUTPATIENT)
Dept: FAMILY MEDICINE CLINIC | Facility: CLINIC | Age: 82
End: 2017-12-06

## 2017-12-06 ENCOUNTER — TELEPHONE (OUTPATIENT)
Dept: FAMILY MEDICINE CLINIC | Facility: CLINIC | Age: 82
End: 2017-12-06

## 2017-12-06 VITALS
RESPIRATION RATE: 16 BRPM | DIASTOLIC BLOOD PRESSURE: 68 MMHG | BODY MASS INDEX: 22.02 KG/M2 | HEIGHT: 66 IN | HEART RATE: 62 BPM | TEMPERATURE: 98.6 F | WEIGHT: 137 LBS | SYSTOLIC BLOOD PRESSURE: 118 MMHG

## 2017-12-06 DIAGNOSIS — R48.2 GAIT APRAXIA OF ELDERLY: ICD-10-CM

## 2017-12-06 DIAGNOSIS — R55 SYNCOPE, UNSPECIFIED SYNCOPE TYPE: Primary | ICD-10-CM

## 2017-12-06 PROCEDURE — 99213 OFFICE O/P EST LOW 20 MIN: CPT | Performed by: FAMILY MEDICINE

## 2017-12-06 NOTE — TELEPHONE ENCOUNTER
----- Message from Virgil Sumner MD sent at 12/6/2017  3:14 PM EST -----  Can we call [facility] to see if they are going out to their house or if she was discharged?  Last note I had was from 10/2017

## 2017-12-06 NOTE — PROGRESS NOTES
Subjective   Mallory Champagne is a 87 y.o. female.     History of Present Illness     We stopped her zebeta  Pt has had issues with disoriented and falling asleep still  She was asking her  to see her parents who have been dead for 50 years  Incontinent of urine    They went to the ER on 12/3/17  Urine was taken for evaluation but no infection was fouind  ER found irregular heart rate.  They placed her on metoprolol 12.5 BID for this    They referred her to Dr. Betty Shearer for evaluation but pt needs us to refer her    She has not been walking at all at home and has been very weak and has fallen again  Has not been falling asleep nor passing out the last 3 days but still very weak      The following portions of the patient's history were reviewed and updated as appropriate: allergies, current medications, past family history, past medical history, past social history, past surgical history and problem list.    Review of Systems   Constitutional: Positive for fatigue.   Respiratory: Positive for shortness of breath.    Cardiovascular: Negative for chest pain and palpitations.   Neurological: Positive for syncope and weakness.       Objective   Physical Exam   Constitutional: She appears well-developed and well-nourished. No distress.   Cardiovascular: Normal rate, regular rhythm and normal heart sounds.    Pulmonary/Chest: Effort normal and breath sounds normal.   Musculoskeletal: She exhibits edema (LUE and LLE with stabole edema).   Psychiatric: She has a normal mood and affect. Her behavior is normal.   Nursing note and vitals reviewed.      Assessment/Plan   Mallory was seen today for follow-up.    Diagnoses and all orders for this visit:    Syncope, unspecified syncope type  -     Ambulatory Referral to Neurology    Gait apraxia of elderly  -     Ambulatory Referral to Home Health    agree with neurology, will request ER report and f/u pending results  Will try to get home health involved again.  She may  need NH placement but  resistant.  HH will do  consult to discuss with family.  They are just having a hard time with her declining health

## 2017-12-06 NOTE — TELEPHONE ENCOUNTER
She was discharged 10/4/17, if you want her seen again they need a new order, o/v note and demographic

## 2017-12-08 ENCOUNTER — TELEPHONE (OUTPATIENT)
Dept: FAMILY MEDICINE CLINIC | Facility: CLINIC | Age: 82
End: 2017-12-08

## 2017-12-08 NOTE — TELEPHONE ENCOUNTER
----- Message from Lucia Zarco sent at 12/8/2017 11:28 AM EST -----  Contact: DR JERI CHANEY FROM [facility] IS CALLING TO LET YOU KNOW THAT SHE WAS ADMITTED TO DAY FOR A SMALL BOWEL OBSTRUCTION

## 2018-01-02 ENCOUNTER — TELEPHONE (OUTPATIENT)
Dept: FAMILY MEDICINE CLINIC | Facility: CLINIC | Age: 83
End: 2018-01-02

## 2018-01-02 RX ORDER — OSELTAMIVIR PHOSPHATE 75 MG/1
75 CAPSULE ORAL DAILY
Qty: 10 CAPSULE | Refills: 0 | Status: SHIPPED | OUTPATIENT
Start: 2018-01-02

## 2018-01-02 NOTE — TELEPHONE ENCOUNTER
----- Message from Mila Brown sent at 1/2/2018  3:15 PM EST -----  Contact: [facility]  Hospice wants to know if Dr. Sumner wants to be patient's attending now that she is at home with Hospice? [phone] [facility]

## 2018-01-04 ENCOUNTER — TELEPHONE (OUTPATIENT)
Dept: FAMILY MEDICINE CLINIC | Facility: CLINIC | Age: 83
End: 2018-01-04

## 2018-01-04 NOTE — TELEPHONE ENCOUNTER
----- Message from Radha Regalado sent at 1/4/2018  1:45 PM EST -----  Contact: Burak Cohen from Hospice called to let Dr. Sumner know that patient passed away 1:25pm today.

## 2018-01-22 ENCOUNTER — TELEPHONE (OUTPATIENT)
Dept: FAMILY MEDICINE CLINIC | Facility: CLINIC | Age: 83
End: 2018-01-22

## 2018-01-22 NOTE — TELEPHONE ENCOUNTER
----- Message from Daria Houston sent at 2018 11:26 AM EST -----  Contact: DR. WILCOX; DEATHE CERTIFICATE NEEDING TO BE SIGNED   [facility] CALLED AND NEEDS THE DEATH CERTIFICATE SIGNED .   SHE PASSED ON 2018      CALL BACK   [phone]  [facility]